# Patient Record
Sex: FEMALE | Race: ASIAN | Employment: OTHER | URBAN - METROPOLITAN AREA
[De-identification: names, ages, dates, MRNs, and addresses within clinical notes are randomized per-mention and may not be internally consistent; named-entity substitution may affect disease eponyms.]

---

## 2018-03-16 ENCOUNTER — HOSPITAL ENCOUNTER (OUTPATIENT)
Dept: GENERAL RADIOLOGY | Age: 63
Discharge: HOME | End: 2018-03-16
Payer: COMMERCIAL

## 2018-03-16 DIAGNOSIS — R09.02 OXYGEN DEFICIENCY: ICD-10-CM

## 2018-03-16 PROCEDURE — 71046 X-RAY EXAM CHEST 2 VIEWS: CPT

## 2019-11-26 ENCOUNTER — HOSPITAL ENCOUNTER (OUTPATIENT)
Dept: GENERAL RADIOLOGY | Age: 64
Discharge: HOME | End: 2019-11-26
Attending: INTERNAL MEDICINE
Payer: COMMERCIAL

## 2019-11-26 ENCOUNTER — HOSPITAL ENCOUNTER (OUTPATIENT)
Dept: ULTRASOUND IMAGING | Age: 64
Discharge: HOME | End: 2019-11-26
Attending: INTERNAL MEDICINE
Payer: COMMERCIAL

## 2019-11-26 DIAGNOSIS — R10.13 EPIGASTRIC PAIN: ICD-10-CM

## 2019-11-26 PROCEDURE — 76700 US EXAM ABDOM COMPLETE: CPT

## 2019-11-26 PROCEDURE — 74241 XR UPPER GI W KUB/ BA SWALLOW: CPT

## 2024-09-19 ENCOUNTER — TRANSCRIBE ORDERS (OUTPATIENT)
Facility: HOSPITAL | Age: 69
End: 2024-09-19

## 2024-09-19 ENCOUNTER — HOSPITAL ENCOUNTER (OUTPATIENT)
Facility: HOSPITAL | Age: 69
Discharge: HOME | End: 2024-09-22
Payer: COMMERCIAL

## 2024-09-19 DIAGNOSIS — M79.89 LEG SWELLING: ICD-10-CM

## 2024-09-19 DIAGNOSIS — M79.605 PAIN IN LEFT LEG: ICD-10-CM

## 2024-09-19 DIAGNOSIS — M79.89 LEG SWELLING: Primary | ICD-10-CM

## 2024-09-19 PROCEDURE — 93971 EXTREMITY STUDY: CPT

## 2025-04-04 ENCOUNTER — HOSPITAL ENCOUNTER (OUTPATIENT)
Facility: HOSPITAL | Age: 70
Setting detail: OUTPATIENT SURGERY
Discharge: HOME | End: 2025-04-04
Attending: INTERNAL MEDICINE | Admitting: INTERNAL MEDICINE
Payer: MEDICARE

## 2025-04-04 ENCOUNTER — ANESTHESIA EVENT (OUTPATIENT)
Facility: HOSPITAL | Age: 70
End: 2025-04-04
Payer: MEDICARE

## 2025-04-04 ENCOUNTER — ANESTHESIA (OUTPATIENT)
Facility: HOSPITAL | Age: 70
End: 2025-04-04
Payer: MEDICARE

## 2025-04-04 VITALS
BODY MASS INDEX: 26.61 KG/M2 | WEIGHT: 155.87 LBS | SYSTOLIC BLOOD PRESSURE: 123 MMHG | HEIGHT: 64 IN | RESPIRATION RATE: 16 BRPM | OXYGEN SATURATION: 100 % | TEMPERATURE: 97.3 F | HEART RATE: 72 BPM | DIASTOLIC BLOOD PRESSURE: 71 MMHG

## 2025-04-04 PROCEDURE — 2580000003 HC RX 258: Performed by: NURSE ANESTHETIST, CERTIFIED REGISTERED

## 2025-04-04 PROCEDURE — 3600007512: Performed by: INTERNAL MEDICINE

## 2025-04-04 PROCEDURE — 2720000010 HC SURG SUPPLY STERILE: Performed by: INTERNAL MEDICINE

## 2025-04-04 PROCEDURE — 7100000010 HC PHASE II RECOVERY - FIRST 15 MIN: Performed by: INTERNAL MEDICINE

## 2025-04-04 PROCEDURE — 3700000001 HC ADD 15 MINUTES (ANESTHESIA): Performed by: INTERNAL MEDICINE

## 2025-04-04 PROCEDURE — 3600007502: Performed by: INTERNAL MEDICINE

## 2025-04-04 PROCEDURE — 88305 TISSUE EXAM BY PATHOLOGIST: CPT

## 2025-04-04 PROCEDURE — 6360000002 HC RX W HCPCS: Performed by: NURSE ANESTHETIST, CERTIFIED REGISTERED

## 2025-04-04 PROCEDURE — 7100000011 HC PHASE II RECOVERY - ADDTL 15 MIN: Performed by: INTERNAL MEDICINE

## 2025-04-04 PROCEDURE — 3700000000 HC ANESTHESIA ATTENDED CARE: Performed by: INTERNAL MEDICINE

## 2025-04-04 PROCEDURE — 2709999900 HC NON-CHARGEABLE SUPPLY: Performed by: INTERNAL MEDICINE

## 2025-04-04 RX ORDER — SODIUM CHLORIDE 9 MG/ML
INJECTION, SOLUTION INTRAVENOUS
Status: DISCONTINUED | OUTPATIENT
Start: 2025-04-04 | End: 2025-04-04 | Stop reason: SDUPTHER

## 2025-04-04 RX ORDER — PANTOPRAZOLE SODIUM 40 MG/1
TABLET, DELAYED RELEASE ORAL
COMMUNITY

## 2025-04-04 RX ORDER — ONDANSETRON 4 MG/1
4 TABLET, ORALLY DISINTEGRATING ORAL EVERY 8 HOURS PRN
Status: DISCONTINUED | OUTPATIENT
Start: 2025-04-04 | End: 2025-04-04 | Stop reason: HOSPADM

## 2025-04-04 RX ORDER — ALBUTEROL SULFATE 90 UG/1
INHALANT RESPIRATORY (INHALATION)
COMMUNITY
Start: 2025-02-27

## 2025-04-04 RX ORDER — MONTELUKAST SODIUM 10 MG/1
TABLET ORAL
COMMUNITY
Start: 2025-01-22

## 2025-04-04 RX ORDER — SODIUM CHLORIDE 0.9 % (FLUSH) 0.9 %
5-40 SYRINGE (ML) INJECTION EVERY 12 HOURS SCHEDULED
Status: DISCONTINUED | OUTPATIENT
Start: 2025-04-04 | End: 2025-04-04 | Stop reason: HOSPADM

## 2025-04-04 RX ORDER — SODIUM CHLORIDE 0.9 % (FLUSH) 0.9 %
5-40 SYRINGE (ML) INJECTION PRN
Status: DISCONTINUED | OUTPATIENT
Start: 2025-04-04 | End: 2025-04-04 | Stop reason: HOSPADM

## 2025-04-04 RX ORDER — PROPOFOL 10 MG/ML
INJECTION, EMULSION INTRAVENOUS
Status: DISCONTINUED | OUTPATIENT
Start: 2025-04-04 | End: 2025-04-04 | Stop reason: SDUPTHER

## 2025-04-04 RX ORDER — SODIUM CHLORIDE 9 MG/ML
25 INJECTION, SOLUTION INTRAVENOUS PRN
Status: DISCONTINUED | OUTPATIENT
Start: 2025-04-04 | End: 2025-04-04 | Stop reason: HOSPADM

## 2025-04-04 RX ORDER — AMLODIPINE BESYLATE AND OLMESARTAN MEDOXOMIL 10; 20 MG/1; MG/1
1 TABLET, FILM COATED ORAL DAILY
COMMUNITY

## 2025-04-04 RX ORDER — ROSUVASTATIN CALCIUM 5 MG/1
1 TABLET, COATED ORAL DAILY
COMMUNITY

## 2025-04-04 RX ORDER — MELOXICAM 7.5 MG/1
TABLET ORAL
COMMUNITY
Start: 2025-01-31

## 2025-04-04 RX ORDER — BUDESONIDE AND FORMOTEROL FUMARATE 160; 4.5 UG/1; UG/1
AEROSOL, METERED RESPIRATORY (INHALATION)
COMMUNITY
Start: 2025-03-11

## 2025-04-04 RX ORDER — SODIUM CHLORIDE 9 MG/ML
INJECTION, SOLUTION INTRAVENOUS PRN
Status: DISCONTINUED | OUTPATIENT
Start: 2025-04-04 | End: 2025-04-04 | Stop reason: HOSPADM

## 2025-04-04 RX ORDER — ONDANSETRON 2 MG/ML
4 INJECTION INTRAMUSCULAR; INTRAVENOUS EVERY 6 HOURS PRN
Status: DISCONTINUED | OUTPATIENT
Start: 2025-04-04 | End: 2025-04-04 | Stop reason: HOSPADM

## 2025-04-04 RX ADMIN — PROPOFOL 50 MG: 10 INJECTION, EMULSION INTRAVENOUS at 13:17

## 2025-04-04 RX ADMIN — PROPOFOL 180 MCG/KG/MIN: 10 INJECTION, EMULSION INTRAVENOUS at 13:18

## 2025-04-04 RX ADMIN — SODIUM CHLORIDE: 9 INJECTION, SOLUTION INTRAVENOUS at 13:13

## 2025-04-04 RX ADMIN — LIDOCAINE HYDROCHLORIDE 100 MG: 20 INJECTION, SOLUTION INFILTRATION; PERINEURAL at 13:17

## 2025-04-04 RX ADMIN — PROPOFOL 50 MG: 10 INJECTION, EMULSION INTRAVENOUS at 13:19

## 2025-04-04 ASSESSMENT — PAIN - FUNCTIONAL ASSESSMENT: PAIN_FUNCTIONAL_ASSESSMENT: 0-10

## 2025-04-04 NOTE — ANESTHESIA POSTPROCEDURE EVALUATION
Department of Anesthesiology  Postprocedure Note    Patient: Mercedes Ferrer  MRN: [id number]  YOB: 1955  Date of evaluation: 4/4/2025    Procedure Summary       Date: 04/04/25 Room / Location: [facility] / [facility] ENDOSCOPY    Anesthesia Start: 1313 Anesthesia Stop: 1329    Procedures:       ESOPHAGOGASTRODUODENOSCOPY (Upper GI Region)      ESOPHAGOGASTRODUODENOSCOPY BIOPSY (Upper GI Region)      ESOPHAGOGASTRODUODENOSCOPY POLYP SNARE (Upper GI Region) Diagnosis:       Gastroesophageal reflux disease with esophagitis, unspecified whether hemorrhage      (Gastroesophageal reflux disease with esophagitis, unspecified whether hemorrhage [K21.00])    Surgeons: Negar Brown MD Responsible Provider: Dany lEias DO    Anesthesia Type: MAC ASA Status: 2            Anesthesia Type: No value filed.    Pat Phase I: Pat Score: 10    Pat Phase II: Pat Score: 10    Anesthesia Post Evaluation    Patient location during evaluation: PACU  Patient participation: complete - patient participated  Level of consciousness: awake and alert  Pain score: 0  Airway patency: patent  Nausea & Vomiting: no vomiting and no nausea  Cardiovascular status: hemodynamically stable  Respiratory status: acceptable  Hydration status: stable  Comments: Patient seen and examined.  Ready for discharge from PACU.  Pain management: adequate and satisfactory to patient    No notable events documented.

## 2025-04-04 NOTE — H&P
.Pre-Endoscopy H&P Update  Chief complaint/HPI/ROS:  The indication for the procedure, the patient's history and the patient's current medications are reviewed prior to the procedure and that data is reported on the H&P to which this document is attached.  Any significant complaints with regard to organ systems will be noted, and if not mentioned then a review of systems is not contributory.  Past Medical History:   Diagnosis Date    Asthma     GERD (gastroesophageal reflux disease)     Hyperlipidemia     Hypertension       History reviewed. No pertinent surgical history.  Social   Social History     Tobacco Use    Smoking status: Never    Smokeless tobacco: Never   Substance Use Topics    Alcohol use: Yes     Comment: occasional 1-2x weekly      History reviewed. No pertinent family history.   No Known Allergies   Prior to Admission Medications   Prescriptions Last Dose Informant Patient Reported? Taking?   VENKAT 10-20 MG per tablet 4/3/2025  Yes Yes   Sig: Take 1 tablet by mouth daily   BREYNA 160-4.5 MCG/ACT AERO 4/4/2025  Yes Yes   albuterol sulfate HFA (PROVENTIL;VENTOLIN;PROAIR) 108 (90 Base) MCG/ACT inhaler Past Month  Yes Yes   Sig: INHALE 2 PUFFS BY MOUTH EVERY 4 HOURS AS NEEDED FOR COUGH, WHEEZING, OR SHORTNESS OF BREATH   meloxicam (MOBIC) 7.5 MG tablet 4/3/2025  Yes Yes   Sig: TAKE 1 TABLET BY MOUTH EVERY DAY AS NEEDED FOR KNEE PAIN   montelukast (SINGULAIR) 10 MG tablet 4/3/2025  Yes Yes   pantoprazole (PROTONIX) 40 MG tablet 4/3/2025  Yes Yes   Sig: PLEASE TAKE BY MOUTH 1 TABLET 30 MIN BEFORE BREAKFAST AND 1 TABLET 30 MIN BEFORE DINNER   rosuvastatin (CRESTOR) 5 MG tablet 4/3/2025  Yes Yes   Sig: Take 1 tablet by mouth daily      Facility-Administered Medications: None       PHYSICAL EXAM:  The patient is examined immediately prior to the procedure.  Vitals:    04/04/25 1245   BP: 133/72   Pulse: 70   Resp: 15   Temp: 98.2 °F (36.8 °C)   SpO2: 99%     Gen: Appears comfortable, no distress.  Pulm:  comfortable respirations with no abnormal audible breath sounds  HEART: well perfused, no abnormal audible heart sounds  GI: abdomen flat.    PLAN:  Informed consent discussion held, patient afforded an opportunity to ask questions and all questions answered.  After being advised of the risks, benefits, and alternatives, the patient requested that we proceed and indicated so on a written consent form.      Will proceed with procedure as planned.  Negar Brown MD

## 2025-04-04 NOTE — OP NOTE
.                         [facility]  [facility]  Negar Brown MD  [phone]      2025    Esophagogastroduodenoscopy (EGD) Procedure Note  Mercedes Ferrer  : 1955  [facility] Medical Record Number: [id number]      Indications:   Worsening GERD symptoms responsive to PPI dose escalation to pantoprazole 40 mg once daily    Referring Physician:  Denver Felton MD  Anesthesia/Sedation: Monitored anesthesia care, see separate note  Endoscopist:  Negar Brown MD   Complications:  None  Estimated Blood Loss:  None    Permit:  The indications, risks, benefits and alternatives were reviewed with the patient or their decision maker who was provided an opportunity to ask questions and all questions were answered.  The specific risks of esophagogastroduodenoscopy with conscious sedation were reviewed, including but not limited to anesthetic complication, bleeding, adverse drug reaction, missed lesion, infection, IV site reactions, and intestinal perforation which would lead to the need for surgical repair.  Alternatives to EGD including radiographic imaging, observation without testing, or laboratory testing were reviewed as well as the limitations of those alternatives discussed.  After considering the options and having all their questions answered, the patient or their decision maker provided both verbal and written consent to proceed.       Procedure in Detail:  After obtaining informed consent, positioning of the patient in the left lateral decubitus position, and conduction of a pre-procedure pause or \"time out\" the endoscope was introduced into the mouth and advanced to the duodenum.  A careful inspection was made, and findings or interventions are described below.    Findings:   Esophagus-normal-appearing esophagus and normal-appearing GE junction  Stomach-multiple proximal gastric body polyps of 8 to 10 mm with slow oozing fully removed with  hot snare cautery and recovered, remainder of gastric mucosa normal-appearing with cold forceps biopsies obtained in the gastric body and antrum in a separate container  Duodenum-healthy-appearing mucosa to the D2 segment    Specimens:   1.  Gastric body and antrum cold forcep mucosal biopsies  2.  Gastric body polyps    Impression:   EGD/GI upper endoscopy with multiple benign-appearing slowly bleeding gastric polyps fully removed with hot snare resection, 4 polyps 8 to 10 mm in size  Otherwise normal-appearing stomach, esophagus and gastroesophageal junction  Mucosal biopsies obtained from the stomach           Recommendations:   Continue pantoprazole 40 mg 30 minutes before breakfast once daily  Avoid aspirin and NSAIDs for the next 7 days  Resume other procedure medications today  Resume preprocedure diet today  No clear high risk features for worsening GERD symptoms identified; as long as GERD are responding to the current pantoprazole dosage we will continue without interruption  Will contact you with biopsy results in 7-10 business days  Please contact my office at [phone] for any questions or concerns.       Thank you for entrusting me with this patient's care.  Please do not hesitate to contact me with any questions or if I can be of assistance with any of your other patients' GI needs.  Signed By: Negar Brown MD                        April 4, 2025     Surgical assistant none.  Implants none unless specified.

## 2025-04-04 NOTE — ANESTHESIA PRE PROCEDURE
Department of Anesthesiology  Preprocedure Note       Name:  Mercedes Ferrer   Age:  70 y.o.  :  1955                                          MRN:  [id number]         Date:  2025      Surgeon: Surgeon(s):  Negar Brown MD    Procedure: Procedure(s):  ESOPHAGOGASTRODUODENOSCOPY    Medications prior to admission:   Prior to Admission medications    Medication Sig Start Date End Date Taking? Authorizing Provider   albuterol sulfate HFA (PROVENTIL;VENTOLIN;PROAIR) 108 (90 Base) MCG/ACT inhaler INHALE 2 PUFFS BY MOUTH EVERY 4 HOURS AS NEEDED FOR COUGH, WHEEZING, OR SHORTNESS OF BREATH 25  Yes José Lares MD   VENKAT 10-20 MG per tablet Take 1 tablet by mouth daily   Yes José Lares MD   BREYNA 160-4.5 MCG/ACT AERO  3/11/25  Yes José Lares MD   meloxicam (MOBIC) 7.5 MG tablet TAKE 1 TABLET BY MOUTH EVERY DAY AS NEEDED FOR KNEE PAIN 25  Yes José Lares MD   montelukast (SINGULAIR) 10 MG tablet  25  Yes José Lares MD   pantoprazole (PROTONIX) 40 MG tablet PLEASE TAKE BY MOUTH 1 TABLET 30 MIN BEFORE BREAKFAST AND 1 TABLET 30 MIN BEFORE DINNER   Yes José Lares MD   rosuvastatin (CRESTOR) 5 MG tablet Take 1 tablet by mouth daily   Yes José Lares MD       Current medications:    No current facility-administered medications for this encounter.       Allergies:  No Known Allergies    Problem List:  There is no problem list on file for this patient.      Past Medical History:        Diagnosis Date   • Asthma    • GERD (gastroesophageal reflux disease)    • Hyperlipidemia    • Hypertension        Past Surgical History:  History reviewed. No pertinent surgical history.    Social History:    Social History     Tobacco Use   • Smoking status: Never   • Smokeless tobacco: Never   Substance Use Topics   • Alcohol use: Yes     Comment: occasional 1-2x weekly                                Counseling given: Not Answered      Vital Signs

## 2025-04-04 NOTE — DISCHARGE INSTRUCTIONS
.                       [facility]  [facility]  Negar Reynoso MD  [phone]      April 4, 2025    Mercedes Ferrer  YOB: 1955    EGD DISCHARGE INSTRUCTIONS    If there is redness at IV site you should apply warm compress to area.  If redness or soreness persist contact Dr. Reynoso's or your primary care doctor.    There may be a slight amount of blood passed from the rectum.  Gaseous discomfort may develop, but walking, belching will help relieve this.  You may not operate a vehicle for 12 hours  You may not operate machinery or dangerous appliances for rest of today  You may not drink alcoholic beverages for 12 hours  Avoid making any critical decisions for 24 hours    DIET:  You may resume your normal diet, but some patients find that heavy or large meals may lead to indigestion or vomiting.  I suggest a light meal as first food intake.    MEDICATIONS:  The use of some over-the-counter pain medication may lead to bleeding after colon biopsies or polyp removal.  Tylenol (also called acetaminophen) is safe to take even if you have had colonoscopy with polyp removal.  Remember that Tylenol (also called acetaminophen) is safe to take after colonoscopy even if you have had biopsies or polyps removed.    ACTIVITY:  You may resume your normal household activities, but it is recommended that you spend the remainder of the day resting -  avoid any strenuous activity.    CALL DR. REYNOSO'S OFFICE IF:  Increasing pain, nausea, vomiting  Abdominal distension (swelling)  Significant new or increased bleeding (oral or rectal)  Fever/Chills  Chest pain/shortness of breath                       Additional instructions:   EGD/GI upper endoscopy with multiple benign-appearing slowly bleeding gastric polyps fully removed with hot snare resection, 4 polyps 8 to 10 mm in size  Otherwise normal-appearing stomach, esophagus and gastroesophageal junction  Mucosal biopsies

## 2025-04-16 ENCOUNTER — TRANSCRIBE ORDERS (OUTPATIENT)
Facility: HOSPITAL | Age: 70
End: 2025-04-16

## 2025-04-16 DIAGNOSIS — M17.12 PRIMARY OSTEOARTHRITIS OF LEFT KNEE: ICD-10-CM

## 2025-04-16 DIAGNOSIS — M23.92 INTERNAL DERANGEMENT OF KNEE, LEFT: Primary | ICD-10-CM

## 2025-04-16 DIAGNOSIS — M25.562 ACUTE PAIN OF LEFT KNEE: ICD-10-CM

## 2025-04-23 ENCOUNTER — HOSPITAL ENCOUNTER (OUTPATIENT)
Facility: HOSPITAL | Age: 70
Discharge: HOME | End: 2025-04-26
Attending: ORTHOPAEDIC SURGERY
Payer: MEDICARE

## 2025-04-23 DIAGNOSIS — M23.92 INTERNAL DERANGEMENT OF KNEE, LEFT: ICD-10-CM

## 2025-04-23 DIAGNOSIS — M17.12 PRIMARY OSTEOARTHRITIS OF LEFT KNEE: ICD-10-CM

## 2025-04-23 DIAGNOSIS — M25.562 ACUTE PAIN OF LEFT KNEE: ICD-10-CM

## 2025-04-23 PROCEDURE — 73721 MRI JNT OF LWR EXTRE W/O DYE: CPT

## (undated) DEVICE — SOLIDIFIER FLD 2OZ 1500CC N DISINF IN BTL DISP SAFESORB

## (undated) DEVICE — SET ADMIN 16ML TBNG L100IN 2 Y INJ SITE IV PIGGY BK DISP (ORDER IN MULIPLES OF 48)

## (undated) DEVICE — ELECTRODE PT RET AD L9FT HI MOIST COND ADH HYDRGEL CORDED

## (undated) DEVICE — KIT COLON W/ 1.1OZ LUB AND 2 END

## (undated) DEVICE — BLUNTFILL WITH FILTER: Brand: MONOJECT

## (undated) DEVICE — CATHETER IV 22GA L1IN OD0.8382-0.9144MM ID0.6096-0.6858MM 382523

## (undated) DEVICE — CANNULA CUSH AD W/ 14FT TBG

## (undated) DEVICE — 1200 GUARD II KIT W/5MM TUBE W/O VAC TUBE: Brand: GUARDIAN

## (undated) DEVICE — SNARE ENDOSCP AD L240CM LOOP W10MM SHTH DIA2.4MM RND INSUL

## (undated) DEVICE — SYRINGE MED 5ML STD CLR PLAS LUERLOCK TIP N CTRL DISP

## (undated) DEVICE — SYRINGE MEDICAL 3ML CLEAR PLASTIC STANDARD NON CONTROL LUERLOCK TIP DISPOSABLE

## (undated) DEVICE — BITEBLOCK ENDOSCP 60FR MAXI WHT POLYETH STURDY W/ VELC WVN

## (undated) DEVICE — BLUNTFILL: Brand: MONOJECT

## (undated) DEVICE — Device

## (undated) DEVICE — ELECTRODE,RADIOTRANSLUCENT,FOAM,3PK: Brand: MEDLINE

## (undated) DEVICE — IV STRT KT 3282] LSL INDUSTRIES INC]